# Patient Record
Sex: MALE | Race: WHITE
[De-identification: names, ages, dates, MRNs, and addresses within clinical notes are randomized per-mention and may not be internally consistent; named-entity substitution may affect disease eponyms.]

---

## 2017-06-26 ENCOUNTER — HOSPITAL ENCOUNTER (INPATIENT)
Dept: HOSPITAL 45 - C.EDB | Age: 82
LOS: 2 days | Discharge: HOME HEALTH | DRG: 440 | End: 2017-06-28
Attending: HOSPITALIST | Admitting: HOSPITALIST
Payer: COMMERCIAL

## 2017-06-26 VITALS
WEIGHT: 156.53 LBS | WEIGHT: 156.53 LBS | HEIGHT: 64 IN | BODY MASS INDEX: 26.72 KG/M2 | BODY MASS INDEX: 26.72 KG/M2 | HEIGHT: 64 IN

## 2017-06-26 DIAGNOSIS — I25.2: ICD-10-CM

## 2017-06-26 DIAGNOSIS — R94.31: ICD-10-CM

## 2017-06-26 DIAGNOSIS — Z79.82: ICD-10-CM

## 2017-06-26 DIAGNOSIS — I71.2: ICD-10-CM

## 2017-06-26 DIAGNOSIS — K59.09: ICD-10-CM

## 2017-06-26 DIAGNOSIS — K85.90: Primary | ICD-10-CM

## 2017-06-26 DIAGNOSIS — Z79.899: ICD-10-CM

## 2017-06-26 DIAGNOSIS — H40.9: ICD-10-CM

## 2017-06-26 DIAGNOSIS — I35.2: ICD-10-CM

## 2017-06-26 DIAGNOSIS — I10: ICD-10-CM

## 2017-06-26 DIAGNOSIS — R07.9: ICD-10-CM

## 2017-06-26 LAB
ALBUMIN/GLOB SERPL: 1.2 {RATIO} (ref 0.9–2)
ALP SERPL-CCNC: 62 U/L (ref 45–117)
ALT SERPL-CCNC: 21 U/L (ref 12–78)
ANION GAP SERPL CALC-SCNC: 7 MMOL/L (ref 3–11)
AST SERPL-CCNC: 20 U/L (ref 15–37)
BASOPHILS # BLD: 0.02 K/UL (ref 0–0.2)
BASOPHILS NFR BLD: 0.2 %
BUN SERPL-MCNC: 15 MG/DL (ref 7–18)
BUN/CREAT SERPL: 15.3 (ref 10–20)
CALCIUM SERPL-MCNC: 9 MG/DL (ref 8.5–10.1)
CHLORIDE SERPL-SCNC: 108 MMOL/L (ref 98–107)
CO2 SERPL-SCNC: 25 MMOL/L (ref 21–32)
COMPLETE: YES
CREAT CL PREDICTED SERPL C-G-VRATE: 48.9 ML/MIN
CREAT SERPL-MCNC: 0.96 MG/DL (ref 0.6–1.4)
EOSINOPHIL NFR BLD AUTO: 259 K/UL (ref 130–400)
GLOBULIN SER-MCNC: 3.1 GM/DL (ref 2.5–4)
GLUCOSE SERPL-MCNC: 98 MG/DL (ref 70–99)
HCT VFR BLD CALC: 39.3 % (ref 42–52)
IG%: 0.1 %
IMM GRANULOCYTES NFR BLD AUTO: 13.5 %
LYMPHOCYTES # BLD: 1.34 K/UL (ref 1.2–3.4)
MCH RBC QN AUTO: 31.3 PG (ref 25–34)
MCHC RBC AUTO-ENTMCNC: 33.1 G/DL (ref 32–36)
MCV RBC AUTO: 94.5 FL (ref 80–100)
MONOCYTES NFR BLD: 10.2 %
NEUTROPHILS # BLD AUTO: 1.9 %
NEUTROPHILS NFR BLD AUTO: 74.1 %
PMV BLD AUTO: 9.1 FL (ref 7.4–10.4)
POTASSIUM SERPL-SCNC: 3.7 MMOL/L (ref 3.5–5.1)
RBC # BLD AUTO: 4.16 M/UL (ref 4.7–6.1)
SODIUM SERPL-SCNC: 140 MMOL/L (ref 136–145)
WBC # BLD AUTO: 9.92 K/UL (ref 4.8–10.8)

## 2017-06-26 NOTE — EMERGENCY ROOM VISIT NOTE
History


Report prepared by Leena:  Nusrat Prakash


Under the Supervision of:  LOREN MonteO.


First contact with patient:  21:40


Chief Complaint:  ABDOMINAL PAIN


Stated Complaint:  HURT STOMACH,CONSTIPATION





History of Present Illness


The patient is an 85 year old male who presents to the Emergency Room with 

complaints of persistent abdominal pain that began 2 weeks ago.  He currently 

rates his discomfort as a 2/10 in severity.  The patient reports that two weeks 

ago his wife was taken to reside in a care home.  He states that he became 

nervous for her leaving, and he developed left chest pain, abdominal pain, and 

constipation.  The patient states that constipation is a chronic problem for him

, noting that he takes Miralax daily.  He states that he has not had a bowel 

movement in four days.  The patient states that his chest pain has been waxing 

and waning, noting that aspirin alleviated his symptoms, but nitroglycerin has 

not.  He states that the chest pain has been persistent for two days.  The 

patient reports radiating pain to his left shoulder and notes pain radiating 

into back.  He denies any pain radiating down his left arm.  The patient 

reports nausea today.  He reports a cardiac history.  The patient denies any 

fever, chills, shortness of breath or lower extremity swelling.  He reports 

difficulty urinating.





   Source of History:  patient


   Onset:  2 weeks ago


   Position:  abdomen


   Symptom Intensity:  2/10


   Timing:  other (persistent)


   Associated Symptoms:  + chest pain, + nausea, + back pain, + urinary symptoms

 (difficulty urinating), No fevers, No chills, No SOB


Note:


Associated Symptoms: left shoulder pain, constipation.





Review of Systems


See HPI for pertinent positives & negatives. A total of 10 systems reviewed and 

were otherwise negative.





Past Medical & Surgical


Medical Problems:


(1) Aortic Valve Disorder


(2) Diverticulitis


(3) Diverticulosis of sigmoid colon


(4) Elevated troponin I level


(5) Hyperlipidemia


(6) Hypertension Nos


(7) Hypothyroidism Nos


Surgical Problems:


(1) Cholecystectomy


(2) Hernia repair








Family History





Patient reports no known family medical history.





Social History


Smoking Status:  Never Smoker


Alcohol Use:  none


Drug Use:  none


Marital Status:  


Housing Status:  lives with significant other


Occupation Status:  retired





Current/Historical Medications


Scheduled


Aspirin (Aspirin Ec), 81 MG PO DAILY


Bimatoprost (Lumigan), 1 DROPS OPL HS


Brimonidine Tartrate-Timolol M (Combigan), 1 DROP OPB BID


Dorzolamide Hcl (Trusopt Oph), 1 DROPS OP BID


Fluocinonide (Fluocinonide), 1 APPLN TOP BID


Losartan Potassium (Cozaar), 1 TAB PO DAILY


Polyethylene Glycol 3350 (Miralax), 17 GM PO QPM


Polyethylene Glycol-Propylene (Systane), 1 DROPS OP DAILY


Prednisolone Acetate (Ophth) (Prednisolone Acetate), 1 DROP OPL QID


Prednisolone Acetate (Ophth) (Prednisolone Acetate), 2 DROP OPR DAILY





Scheduled PRN


Nitroglycerin (Nitrostat), 0.4 MG SL UD PRN for Chest Pain





Allergies


Coded Allergies:  


     Amlodipine (Verified  Allergy, Unknown, unknown, 12/22/16)


     Enalapril (Verified  Allergy, Unknown, unknown, 12/22/16)


     Sulfa Drugs (Verified  Adverse Reaction, Mild, JUST DID NOT FEEL LIKE 

HIMSELF, 12/22/16)





Physical Exam


Vital Signs











  Date Time  Temp Pulse Resp B/P (MAP) Pulse Ox O2 Delivery O2 Flow Rate FiO2


 


6/27/17 02:52  72 16 139/81 98 Room Air  


 


6/26/17 23:16  81 18 145/87 95 Room Air  


 


6/26/17 20:57 36.7 74 20 153/92 95 Room Air  











Physical Exam


GENERAL: alert, well appearing, well nourished, no distress, non-toxic 


EYE EXAM: normal conjunctiva, PERRL and EOM's grossly intact


OROPHARYNX: no exudate, no erythema, lips, buccal mucosa, and tongue normal and 

mucous membranes are moist


NECK: supple, no nuchal rigidity, no adenopathy, non-tender


LUNGS: Clear to auscultation. Normal chest wall mechanics


HEART: Systolic ejection murmur, S1 normal and S2 normal 


ABDOMEN: Mild generalized abdominal discomfort, dull to percussion, abdomen soft

, normo-active bowel sounds, no masses, no rebound or guarding. 


BACK: Back is symmetrical on inspection and there is no deformity, no midline 

tenderness, no CVA tenderness. 


SKIN: no rashes and no bruising 


UPPER EXTREMITIES: upper extremities are grossly normal. 


LOWER EXTREMITIES: Trace pitting edema.


NEURO EXAM: Normal sensorium, cranial nerves II-XII grossly intact, normal 

speech,  no gross weakness of arms, no gross weakness of legs.





Medical Decision & Procedures


ER Provider


Diagnostic Interpretation:


Chest x-ray interpreted by me, pending radiology review: cardiomegaly, 

prominent aortic silhouette, no effusion, increased interstitial markings 

bilaterally, no focal consolidation, similar compared to prior





CT abdomen and pelvis:


Mild peripancreatic haziness that may be from pancreatitis.  No pseudocyst.  

Cholecystectomy.  Aneurysmal ascending aorta measuring 5.5 cm.  Kelvin megaly.  

Small fat-containing ventral inguinal hernias.  Old granulomatous disease.  

Right renal cyst.  2 small to characterize low attenuation foci in the kidneys.

  Mildly enlarged prostate.  Colonic diverticula without diverticulitis.  

Unremarkable appendix.


radiologist: Manal Schoellerman, M.D.


study read at 00:47





Laboratory Results


6/26/17 23:05








Red Blood Count 4.16, Mean Corpuscular Volume 94.5, Mean Corpuscular Hemoglobin 

31.3, Mean Corpuscular Hemoglobin Concent 33.1, Mean Platelet Volume 9.1, 

Neutrophils (%) (Auto) 74.1, Lymphocytes (%) (Auto) 13.5, Monocytes (%) (Auto) 

10.2, Eosinophils (%) (Auto) 1.9, Basophils (%) (Auto) 0.2, Neutrophils # (Auto

) 7.35, Lymphocytes # (Auto) 1.34, Monocytes # (Auto) 1.01, Eosinophils # (Auto

) 0.19, Basophils # (Auto) 0.02





6/26/17 23:05

















Test


  6/26/17


23:05 6/26/17


23:50


 


White Blood Count


  9.92 K/uL


(4.8-10.8) 


 


 


Red Blood Count


  4.16 M/uL


(4.7-6.1) 


 


 


Hemoglobin


  13.0 g/dL


(14.0-18.0) 


 


 


Hematocrit 39.3 % (42-52)  


 


Mean Corpuscular Volume


  94.5 fL


() 


 


 


Mean Corpuscular Hemoglobin


  31.3 pg


(25-34) 


 


 


Mean Corpuscular Hemoglobin


Concent 33.1 g/dl


(32-36) 


 


 


Platelet Count


  259 K/uL


(130-400) 


 


 


Mean Platelet Volume


  9.1 fL


(7.4-10.4) 


 


 


Neutrophils (%) (Auto) 74.1 %  


 


Lymphocytes (%) (Auto) 13.5 %  


 


Monocytes (%) (Auto) 10.2 %  


 


Eosinophils (%) (Auto) 1.9 %  


 


Basophils (%) (Auto) 0.2 %  


 


Neutrophils # (Auto)


  7.35 K/uL


(1.4-6.5) 


 


 


Lymphocytes # (Auto)


  1.34 K/uL


(1.2-3.4) 


 


 


Monocytes # (Auto)


  1.01 K/uL


(0.11-0.59) 


 


 


Eosinophils # (Auto)


  0.19 K/uL


(0-0.5) 


 


 


Basophils # (Auto)


  0.02 K/uL


(0-0.2) 


 


 


RDW Standard Deviation


  41.5 fL


(36.4-46.3) 


 


 


RDW Coefficient of Variation


  12.1 %


(11.5-14.5) 


 


 


Immature Granulocyte % (Auto) 0.1 %  


 


Immature Granulocyte # (Auto)


  0.01 K/uL


(0.00-0.02) 


 


 


Anion Gap


  7.0 mmol/L


(3-11) 


 


 


Est Creatinine Clear Calc


Drug Dose 48.9 ml/min 


  


 


 


Estimated GFR (


American) 83.2 


  


 


 


Estimated GFR (Non-


American 71.8 


  


 


 


BUN/Creatinine Ratio 15.3 (10-20)  


 


Lactic Acid Level


  0.7 mmol/L


(0.4-2.0) 


 


 


Calcium Level


  9.0 mg/dl


(8.5-10.1) 


 


 


Total Bilirubin


  2.6 mg/dl


(0.2-1) 


 


 


Aspartate Amino Transf


(AST/SGOT) 20 U/L (15-37) 


  


 


 


Alanine Aminotransferase


(ALT/SGPT) 21 U/L (12-78) 


  


 


 


Alkaline Phosphatase


  62 U/L


() 


 


 


Total Protein


  6.9 gm/dl


(6.4-8.2) 


 


 


Albumin


  3.8 gm/dl


(3.4-5.0) 


 


 


Globulin


  3.1 gm/dl


(2.5-4.0) 


 


 


Albumin/Globulin Ratio 1.2 (0.9-2)  


 


Lipase


  2575 U/L


() 


 


 


Urine Color  YELLOW 


 


Urine Appearance  CLEAR (CLEAR) 


 


Urine pH  6.0 (4.5-7.5) 


 


Urine Specific Gravity


  


  1.009


(1.000-1.030)


 


Urine Protein  NEG (NEG) 


 


Urine Glucose (UA)  NEG (NEG) 


 


Urine Ketones  NEG (NEG) 


 


Urine Occult Blood  NEG (NEG) 


 


Urine Nitrite  NEG (NEG) 


 


Urine Bilirubin  NEG (NEG) 


 


Urine Urobilinogen  NEG (NEG) 


 


Urine Leukocyte Esterase  NEG (NEG) 








Laboratory results per my review.





Medications Administered











 Medications


  (Trade)  Dose


 Ordered  Sig/González


 Route  Start Time


 Stop Time Status Last Admin


Dose Admin


 


 Sodium Chloride  1,000 ml @ 


 200 mls/hr  Q5H STAT


 IV  6/26/17 23:44


 6/27/17 04:24 DC 6/26/17 23:44


200 MLS/HR











ECG


Indication:  chest pain


Rate (beats per minute):  57


Rhythm:  sinus bradycardia


Findings:  1st degree AV block, LBBB, no acute ischemic change, left axis 

deviation


Comparison ECG Date:  12/23/16


Change:


When compared to EKG done on 12/23/16, QRS has widened, but no change in 

morphology.





ED Course


2145: The patient was evaluated in room C11B. A complete history and physical 

exam was performed. 





0150: Patient resting comfortably, made aware of all results.  Patient denies 

any recent alcohol use, states his gallbladder was previously removed.  Patient 

agreeable with plan for admission.





Medical Decision


Differential diagnoses includes but is not limited to acute coronary syndrome, 

myocardial infarction, pericarditis, pulmonary embolus, aortic dissection, 

pneumonia, pneumothorax, musculoskeletal, shingles, esophageal, gastritis, 

peptic ulcer disease, GERD, gallbladder disease, pancreatitis, small bowel 

obstruction, ischemic bowel, irritable bowel disease, irritable bowel syndrome, 

appendicitis, diverticulitis, malignancy, hernia, urinary tract infection, 

torsion, perforation, trauma, infectious. 





Medication Reconciliation: I attest that I have personally reviewed the patient'

s current medication list.





Blood pressure screening: Patient was found to have an elevated blood pressure 

and was referred to their primary doctor for recheck and further treatment. 





Patient with atypical presentation for both abdominal discomfort as well as 

chest pain.  Patient with significant prior cardiac history and previously left 

AGAINST MEDICAL ADVICE while having active ACS.  Patient resting here, stable 

vital signs, and only mild discomfort noted during exam.  Patient found to have 

significant elevated lipase and evidence of pancreatitis by CT.  Patient has 

had prior cholecystectomy and denies any recent alcohol use.  Patient was 

agreeable with admission for continued monitoring and treatment.  No evidence 

of ACS at this time.  Likely slight EKG abnormalities related to prior ACS that 

was not treated.  Doubt additional vascular/aortic pathology.  Doubt PE or 

pulmonary pathology.  No evidence of perforation or GI bleed.  Patient felt 

pain controlled here and didn't denied any additional medications.  Patient 

aware of all results.





Impression





 Primary Impression:  


 GENERALIZED ABDOMINAL PAIN


 Additional Impressions:  


 Pancreatitis


 Chest pain





Scribe Attestation


The scribe's documentation has been prepared under my direction and personally 

reviewed by me in its entirety. I confirm that the note above accurately 

reflects all work, treatment, procedures, and medical decision making performed 

by me.





Departure Information


Referrals


SILVIA David MD (PCP)





Patient Instructions


My [facility]





Problem Qualifiers








 Additional Impressions:  


 Pancreatitis


 Chronicity:  acute  Pancreatitis type:  unspecified pancreatitis type  Acute 

pancreatitis complication:  no infection or necrosis  Qualified Codes:  K85.90 

- Acute pancreatitis without necrosis or infection, unspecified


 Chest pain


 Chest pain type:  unspecified  Qualified Codes:  R07.9 - Chest pain, 

unspecified

## 2017-06-27 VITALS
TEMPERATURE: 97.7 F | SYSTOLIC BLOOD PRESSURE: 118 MMHG | OXYGEN SATURATION: 97 % | DIASTOLIC BLOOD PRESSURE: 70 MMHG | HEART RATE: 45 BPM

## 2017-06-27 VITALS
DIASTOLIC BLOOD PRESSURE: 75 MMHG | TEMPERATURE: 99.14 F | SYSTOLIC BLOOD PRESSURE: 128 MMHG | OXYGEN SATURATION: 98 % | HEART RATE: 63 BPM

## 2017-06-27 VITALS
HEART RATE: 54 BPM | TEMPERATURE: 98.06 F | SYSTOLIC BLOOD PRESSURE: 167 MMHG | DIASTOLIC BLOOD PRESSURE: 79 MMHG | OXYGEN SATURATION: 99 %

## 2017-06-27 VITALS
TEMPERATURE: 98.24 F | HEART RATE: 61 BPM | DIASTOLIC BLOOD PRESSURE: 77 MMHG | OXYGEN SATURATION: 98 % | SYSTOLIC BLOOD PRESSURE: 132 MMHG

## 2017-06-27 VITALS
TEMPERATURE: 99.32 F | HEART RATE: 67 BPM | OXYGEN SATURATION: 95 % | DIASTOLIC BLOOD PRESSURE: 78 MMHG | SYSTOLIC BLOOD PRESSURE: 137 MMHG

## 2017-06-27 VITALS
OXYGEN SATURATION: 94 % | TEMPERATURE: 98.06 F | DIASTOLIC BLOOD PRESSURE: 87 MMHG | HEART RATE: 62 BPM | SYSTOLIC BLOOD PRESSURE: 149 MMHG

## 2017-06-27 LAB
APPEARANCE UR: CLEAR
BILIRUB UR-MCNC: (no result) MG/DL
CHOLEST/HDLC SERPL: 4.5 {RATIO}
CKMB/CK RATIO: 3.3 (ref 0–3)
CKMB/CK RATIO: 3.8 (ref 0–3)
COLOR UR: YELLOW
GLUCOSE UR QL: 39 MG/DL
INR PPP: 1 (ref 0.9–1.1)
KETONES UR QL STRIP: 119 MG/DL
MANUAL MICROSCOPIC REQUIRED?: NO
NITRITE UR QL STRIP: (no result)
NITRITE UR QL STRIP: 79 MG/DL (ref 0–150)
PARTIAL THROMBOPLASTIN RATIO: 1
PH UR STRIP: 6 [PH] (ref 4.5–7.5)
PH UR: 174 MG/DL (ref 0–200)
PROTHROMBIN TIME: 11 SECONDS (ref 9–12)
REVIEW REQ?: NO
SP GR UR STRIP: 1.01 (ref 1–1.03)
URINE BILL WITH OR WITHOUT MIC: (no result)
UROBILINOGEN UR-MCNC: (no result) MG/DL
VERY LOW DENSITY LIPOPROT CALC: 16 MG/DL
ZZUR CULT IF INDIC CLEAN CATCH: NO

## 2017-06-27 RX ADMIN — DORZOLAMIDE HYDROCHLORIDE SCH DROPS: 20 SOLUTION/ DROPS OPHTHALMIC at 09:13

## 2017-06-27 RX ADMIN — POLYVINYL ALCOHOL SCH DROPS: 14 SOLUTION/ DROPS OPHTHALMIC at 07:46

## 2017-06-27 RX ADMIN — Medication SCH MG: at 07:46

## 2017-06-27 RX ADMIN — SODIUM CHLORIDE AND POTASSIUM CHLORIDE SCH MLS/HR: 9; 1.49 INJECTION, SOLUTION INTRAVENOUS at 16:33

## 2017-06-27 RX ADMIN — ALUMINUM ZIRCONIUM TRICHLOROHYDREX GLY SCH EA: 0.2 STICK TOPICAL at 16:00

## 2017-06-27 RX ADMIN — LOSARTAN POTASSIUM SCH MG: 25 TABLET ORAL at 07:47

## 2017-06-27 RX ADMIN — POLYVINYL ALCOHOL SCH DROPS: 14 SOLUTION/ DROPS OPHTHALMIC at 04:58

## 2017-06-27 RX ADMIN — Medication SCH GM: at 07:47

## 2017-06-27 RX ADMIN — DORZOLAMIDE HYDROCHLORIDE SCH DROPS: 20 SOLUTION/ DROPS OPHTHALMIC at 07:46

## 2017-06-27 RX ADMIN — POLYVINYL ALCOHOL SCH DROPS: 14 SOLUTION/ DROPS OPHTHALMIC at 09:12

## 2017-06-27 RX ADMIN — SODIUM CHLORIDE AND POTASSIUM CHLORIDE SCH MLS/HR: 9; 1.49 INJECTION, SOLUTION INTRAVENOUS at 04:58

## 2017-06-27 NOTE — HISTORY AND PHYSICAL
History & Physical


Date & Time of Service:


Jun 27, 2017 at 03:22


Chief Complaint:


Hurt Stomach,Constipation


Primary Care Physician:


SILVIA David MD


History of Present Illness


Source:  patient, hospital records


The patient is a 85-year-old male who presents to emergency department with 

symptoms of abdominal pain that began about 2 weeks ago, and more recently over 

the past 2 days has developed chest pain radiating to his left shoulder and 

toward his back.  He does have a known history of heart disease.  He does 

report that aspirin has helped to relieve his chest discomfort, but 

nitroglycerin sublinguals have not.  He presently is under stress due to the 

illness of his wife who was moved to a care home 2 weeks ago.  He does have 

chronic constipation for which MiraLAX helps, but he has not had a bowel 

movement in 4 days.  He has chronic difficulty urinating.





Past Medical/Surgical History


Medical Problems:


(1) Aortic Valve Disorder


Status: Chronic  





(2) Diverticulitis


Status: Chronic  





(3) Diverticulosis of sigmoid colon


Status: Resolved  





(4) Hyperlipidemia


Status: Chronic  





(5) Hypertension Nos


Status: Chronic  





(6) Hypothyroidism Nos


Status: Chronic  





Surgical Problems:


(1) Cholecystectomy


Status: Resolved  





(2) Hernia repair


Status: Resolved  











Family History





Patient reports no known family medical history.





Social History


Smoking Status:  Never Smoker


Smokeless Tobacco Use:  No


Alcohol Use:  none


Drug Use:  none


Marital Status:  


Housing status:  lives with family


Occupational Status:  retired





Immunizations


History of Influenza Vaccine:  No


History of Tetanus Vaccine?:  No


History of Pneumococcal:  No


History of Hepatitis B Vaccine:  No





Multi-Drug Resistant Organisms


History of MDRO:  No





Allergies


Coded Allergies:  


     Amlodipine (Verified  Allergy, Unknown, unknown, 12/22/16)


     Enalapril (Verified  Allergy, Unknown, unknown, 12/22/16)


     Sulfa Drugs (Verified  Adverse Reaction, Mild, JUST DID NOT FEEL LIKE 

HIMSELF, 12/22/16)





Home Medications


Scheduled


Aspirin (Aspirin Ec), 81 MG PO DAILY


Bimatoprost (Lumigan), 1 DROPS OPL HS


Brimonidine Tartrate-Timolol M (Combigan), 1 DROP OPB BID


Dorzolamide Hcl (Trusopt Oph), 1 DROPS OP BID


Fluocinonide (Fluocinonide), 1 APPLN TOP BID


Losartan Potassium (Cozaar), 1 TAB PO DAILY


Polyethylene Glycol 3350 (Miralax), 17 GM PO QPM


Polyethylene Glycol-Propylene (Systane), 1 DROPS OP DAILY


Prednisolone Acetate (Ophth) (Prednisolone Acetate), 1 DROP OPL QID


Prednisolone Acetate (Ophth) (Prednisolone Acetate), 2 DROP OPR DAILY





Scheduled PRN


Nitroglycerin (Nitrostat), 0.4 MG SL UD PRN for Chest Pain





Review of Systems


The patient denies cough, lower extremity swelling,  sore throat, fevers, chills

, sweats, weight change, vomiting, pelvic pain, blood in urine or stool, 

lightheadedness, dizziness, headache, memory loss, rash, abnormal bruising or 

bleeding, imbalance, focal or generalized weakness, numbness or tingling in 

arms or legs, night sweats.


The review of systems is otherwise negative other than for that already noted 

above, and at least 10 systems have been reviewed.





Physical Exam


Vital Signs











  Date Time  Temp Pulse Resp B/P (MAP) Pulse Ox O2 Delivery O2 Flow Rate FiO2


 


6/27/17 02:52  72 16 139/81 98 Room Air  


 


6/26/17 23:16  81 18 145/87 95 Room Air  


 


6/26/17 20:57 36.7 74 20 153/92 95 Room Air  








The patient is awake, well-developed and adequately nourished, alert and 

oriented 3, normocephalic and atraumatic, lying in bed and in no acute 

distress.


HEENT--PERRL, EOMI, mucous membranes  and oropharynx dry.


Neck--supple, no JVD or bruits, thyroid normal, trachea midline, no adenopathy.


Heart--normal S1 and S2, no extra beats, no murmurs, rubs or gallops.


Lungs--clear bilaterally with good air movement, no respiratory distress, no 

accessory muscle use.


Abdomen--normal bowel sounds and soft, nontender and nondistended, no hernias 

or masses,  no organomegaly.


Extremities--no cyanosis, clubbing or edema. There are good distal pulses b/l.


Dermatologic--normal skin turgor, normal color, warm and dry, no abnormal lymph 

nodes, no rash.


Neurologic--cranial nerves II through XII grossly intact, motor and sensory 

examination normal.


Rheumatologic--normal range of motion, nontender, muscles and joints.


Psychiatric--normal affect.





Diagnostics


Laboratory Results





Results Past 24 Hours








Test


  6/26/17


23:05 6/26/17


23:50 Range/Units


 


 


White Blood Count 9.92  4.8-10.8  K/uL


 


Red Blood Count 4.16  4.7-6.1  M/uL


 


Hemoglobin 13.0  14.0-18.0  g/dL


 


Hematocrit 39.3  42-52  %


 


Mean Corpuscular Volume 94.5    fL


 


Mean Corpuscular Hemoglobin 31.3  25-34  pg


 


Mean Corpuscular Hemoglobin


Concent 33.1


  


  32-36  g/dl


 


 


Platelet Count 259  130-400  K/uL


 


Mean Platelet Volume 9.1  7.4-10.4  fL


 


Neutrophils (%) (Auto) 74.1   %


 


Lymphocytes (%) (Auto) 13.5   %


 


Monocytes (%) (Auto) 10.2   %


 


Eosinophils (%) (Auto) 1.9   %


 


Basophils (%) (Auto) 0.2   %


 


Neutrophils # (Auto) 7.35  1.4-6.5  K/uL


 


Lymphocytes # (Auto) 1.34  1.2-3.4  K/uL


 


Monocytes # (Auto) 1.01  0.11-0.59  K/uL


 


Eosinophils # (Auto) 0.19  0-0.5  K/uL


 


Basophils # (Auto) 0.02  0-0.2  K/uL


 


RDW Standard Deviation 41.5  36.4-46.3  fL


 


RDW Coefficient of Variation 12.1  11.5-14.5  %


 


Immature Granulocyte % (Auto) 0.1   %


 


Immature Granulocyte # (Auto) 0.01  0.00-0.02  K/uL


 


Sodium Level 140  136-145  mmol/L


 


Potassium Level 3.7  3.5-5.1  mmol/L


 


Chloride Level 108    mmol/L


 


Carbon Dioxide Level 25  21-32  mmol/L


 


Anion Gap 7.0  3-11  mmol/L


 


Blood Urea Nitrogen 15  7-18  mg/dl


 


Creatinine


  0.96


  


  0.60-1.40


mg/dl


 


Est Creatinine Clear Calc


Drug Dose 48.9


  


   ml/min


 


 


Estimated GFR (


American) 83.2


  


   


 


 


Estimated GFR (Non-


American 71.8


  


   


 


 


BUN/Creatinine Ratio 15.3  10-20  


 


Random Glucose 98  70-99  mg/dl


 


Lactic Acid Level 0.7  0.4-2.0  mmol/L


 


Calcium Level 9.0  8.5-10.1  mg/dl


 


Total Bilirubin 2.6  0.2-1  mg/dl


 


Aspartate Amino Transf


(AST/SGOT) 20


  


  15-37  U/L


 


 


Alanine Aminotransferase


(ALT/SGPT) 21


  


  12-78  U/L


 


 


Alkaline Phosphatase 62    U/L


 


Troponin I 0.018  0-0.045  ng/ml


 


Total Protein 6.9  6.4-8.2  gm/dl


 


Albumin 3.8  3.4-5.0  gm/dl


 


Globulin 3.1  2.5-4.0  gm/dl


 


Albumin/Globulin Ratio 1.2  0.9-2  


 


Lipase 2575    U/L


 


Urine Color  YELLOW  


 


Urine Appearance  CLEAR CLEAR  


 


Urine pH  6.0 4.5-7.5  


 


Urine Specific Gravity  1.009 1.000-1.030  


 


Urine Protein  NEG NEG  


 


Urine Glucose (UA)  NEG NEG  


 


Urine Ketones  NEG NEG  


 


Urine Occult Blood  NEG NEG  


 


Urine Nitrite  NEG NEG  


 


Urine Bilirubin  NEG NEG  


 


Urine Urobilinogen  NEG NEG  


 


Urine Leukocyte Esterase  NEG NEG  











EKG


EKG shows sinus bradycardia at 57 beats minute, with first-degree heart block, 

left axis deviation, LVH, question new LAFB with QRS widening, which is new 

compared to December 2016.





Impression


Assessment and Plan


Chest pain/new EKG changes--the patient be admitted to the telemetry unit for 

serial cardiac enzymes, cardiac rhythm monitoring and a 2-D echocardiogram with 

Dopplers.  We'll continue aspirin 81 mg by mouth daily and losartan.  We'll 

consult cardiology.





Pancreatitis--lipase is elevated at 2575.  We'll follow serial laboratories, 

keep on a full liquid diet.





Glaucoma--continue usual medications of Lumigan, Combigan, Trusopt, 

prednisolone acetate and Systane.





Constipation--continue MiraLAX daily.





Level of Care


Telemetry





Advanced Directives


Existing Advance Directive:  No


Existing Living Will:  No


Existing Power of :  No





Resuscitation Status


FULL RESUSCITATION





VTE Prophylaxis


VTE Risk Assessment Done? Y/N:  Yes


Risk Level:  Low


Given or contraindicated:  SCD's

## 2017-06-27 NOTE — DIAGNOSTIC IMAGING REPORT
CT ANGIOGRAPHY OF THE CHEST WITH AND WITHOUT CONTRAST



CLINICAL HISTORY: Thoracic aortic aneurysm. Chest pain.    



TECHNIQUE: Unenhanced and arterial phase imaging of the chest was performed.

Injection of 94 cc of Optiray 320 IV was uneventful. Sagittal and coronal

reconstructed reviewed as well as maximal intensity projections on an

independent 3-D workstation.



COMPARISON STUDY:  Chest CT November 2, 2007.



FINDINGS: There is no intramural hematoma within the thoracic aorta. No

dissection of the thoracic aorta is noted. The ascending aorta measures 5.3 cm

at the level of the main pulmonary artery. This is minimally increased since CT

of November 2, 2007 when it measured 5.2 cm. There is moderate plaque. There is

extensive aortic valvular calcification. Aorta measures 4.2 cm at the level of

the sinuses of Valsalva. The heart is moderately enlarged. There is no

pericardial effusion. There are several calcified subcarinal lymph nodes.

Central airways are patent. There is no consolidation to suggest pneumonia. No

pneumothorax or pleural effusion is present. Bony thorax is unremarkable.

Visualized portions the upper abdomen demonstrate a few right renal cysts and

peripancreatic infiltration suggestive of acute pancreatitis which was shown on

recent abdominal CT.



IMPRESSION:  



1. Ascending aortic aneurysm, measuring 5.3 cm. Minimal increase in caliber

since exam of November 2, 2007. No dissection.



2. Moderate cardiomegaly.



3. No acute intrathoracic findings.



4. Peripancreatic infiltration indicative of acute pancreatitis, as shown on

recent abdominal CT.







Electronically signed by:  Gabino Brown M.D.

6/27/2017 1:53 PM



Dictated Date/Time:  6/27/2017 1:40 PM

## 2017-06-27 NOTE — DIAGNOSTIC IMAGING REPORT
CT OF THE ABDOMEN AND PELVIS WITH CONTRAST



CLINICAL HISTORY: Pancreatitis.    



COMPARISON STUDY:  CT of the abdomen and pelvis September 1, 2013 and MRCP

September 3, 2013



TECHNIQUE: Following IV administration of 117 mL of Optiray-320, axial images of

the abdomen and pelvis were obtained from the lung bases to the proximal femurs.

Images were reviewed in the axial, sagittal, and coronal planes. IV contrast was

administered without complication.



CT DOSE: 350.96 mGy.cm



FINDINGS: Visual portions of the lower chest demonstrate aneurysmal dilatation

of visualized portions of the ascending aorta which measures approximately 5.5

cm at the level of the main pulmonary artery. This is partially imaged on this

exam but likely mildly increased since exam of November 2, 2007 when it measured

5.2 cm. No dissection is shown within visualized portions of the aorta. The

abdominal aorta is ectatic with moderate plaque. The liver, spleen and adrenal

glands are unremarkable. There is no biliary ductal dilatation status post

cholecystectomy. There is mild peripancreatic infiltration and fluid. There is

no peripancreatic fluid collection. There is no pancreatic ductal dilatation.

Several water attenuation bilateral renal lesions reflect cysts. A few

subcentimeter renal lesions are too small to characterize. There is no evidence

for a bowel obstruction. The appendix is normal. This extensive colonic

diverticulosis without evidence for acute diverticulitis. There are

fat-containing bilateral inguinal hernias and fat-containing ventral hernia. The

prostate is moderately enlarged.







IMPRESSION:  



1. Mild peripancreatic infiltration and fluid suggestive of acute pancreatitis.

No biliary ductal dilatation status post cholecystectomy.



2. Aneurysmal dilatation of the ascending aorta, partially imaged on this exam.

Aorta measures approximately 5.5 cm at the level of the main pulmonary artery

which is mildly increased since CT of November 2, 2007.



3. Moderate cardiomegaly.



4. Left colon diverticulosis without evidence for acute diverticulitis.







Electronically signed by:  Gabino Brown M.D.

6/27/2017 7:31 AM



Dictated Date/Time:  6/27/2017 7:20 AM

## 2017-06-27 NOTE — HOSPITALIST PROGRESS NOTE
Hospitalist Progress Note


Date of Service


Jun 27, 2017.


 (Rosy Dennis ., PA-C)





Subjective


Pt evaluation today including:  conversation w/ patient, conversation w/ family 

(daughter- at bedside ), physical exam, chart review, lab review, review of 

studies, review of inpatient medication list


Voiding:  no voiding problems, no incontinence


Patient states he is currently feeling comfortable.


Admits to epigastric region pain- IV Morphine w/ good pain control. 


Decreased appetite- currently on full liquids.


+Constipation 


+Intermittent left sided CP radiating to left shoulder region. Denies any 

alleviating/exacerbation factors.


Patient denies any fever, chills, sweats, lightheadedness, dizziness, vision 

changes, palpitations, edema, SOB, wheezing, cough, nausea, vomiting, diarrhea, 

urinary symptoms, melena, numbness/tingling, weakness, muscle/joint pain, 

anxiety/depression, active bleeding, or new skin discoloration/changes. 





Daughter at bedside- she is concerned about patient returning home as he lives 

alone. His wife is currently on hospice and staying with the daughter so most 

of her time is occupied. All other questions/concerns answered.  


 (Rosy Dennis ., PA-C)





Medications





Current Inpatient Medications








 Medications


  (Trade)  Dose


 Ordered  Sig/González


 Route  Start Time


 Stop Time Status Last Admin


Dose Admin


 


 Ioversol


  (Optiray 320)  100 ml  UD  PRN


 IV  6/27/17 00:00


 7/1/17 00:00   


 


 


 Aspirin


  (Ecotrin Tab)  81 mg  DAILY


 PO  6/27/17 09:00


 7/27/17 08:59  6/27/17 07:46


81 MG


 


 Dorzolamide HCl


  (Trusopt 2% Oph


 Soln)  1 drops  BID


 OP  6/27/17 09:00


 7/27/17 08:59   


 


 


 Losartan Potassium


  (coZAAR TAB)  25 mg  DAILY


 PO  6/27/17 09:00


 7/27/17 08:59  6/27/17 07:47


25 MG


 


 Nitroglycerin


  (Nitrostat Tab)  0.4 mg  UD  PRN


 SL  6/27/17 03:00


 7/27/17 02:59   


 


 


 Miscellaneous


 Information


  (Order Awaiting


 Action)  1 ea  QS


 N/A  6/27/17 16:00


 7/27/17 15:59   


 


 


 Bimatoprost


  (Lumigan 0.01%)  1 drops  HS


 OPL  6/27/17 21:00


 7/27/17 20:59  6/27/17 07:46


1 DROPS


 


 Artificial Tears


  (Artificial


 Tears)  1 drops  DAILY


 OP  6/27/17 04:30


 7/27/17 04:29  6/27/17 04:58


1 DROPS


 


 Polyethylene


  (Miralax Powder


 Packet)  17 gm  DAILY


 PO  6/27/17 09:00


 7/27/17 08:59  6/27/17 07:47


17 GM


 


 Miscellaneous


  (Iv Fluids


 Completed)  1 ea  PRN  PRN


 N/A  6/27/17 03:15


 6/27/18 03:14   


 


 


 Potassium


 Chloride/Sodium


 Chloride  1,000 ml @ 


 100 mls/hr  Q10H


 IV  6/27/17 04:30


 7/27/17 04:29  6/27/17 04:58


100 MLS/HR


 


 Acetaminophen


  (Tylenol Tab)  650 mg  Q4H  PRN


 PO  6/27/17 03:00


 7/27/17 02:59  6/27/17 04:10


650 MG


 


 Zolpidem Tartrate


  (Ambien Tab)  5 mg  HSZ  PRN


 PO  6/27/17 03:00


 7/27/17 02:59   


 


 


 Ondansetron HCl


  (Zofran Inj)  4 mg  Q6H  PRN


 IV  6/27/17 03:00


 7/27/17 02:59   


 


 


 Miscellaneous


 Information


  (Order Awaiting


 Action)  1 ea  QS


 N/A  6/27/17 08:00


 7/27/17 07:59   


 


 


 Miscellaneous


 Information


  (Order Awaiting


 Action)  1 ea  QS


 N/A  6/27/17 08:00


 7/27/17 07:59   


 


 


 Morphine Sulfate


  (MoRPHine


 SULFATE INJ)  1 mg  Q3H  PRN


 IV  6/27/17 09:00


 7/11/17 08:59  6/27/17 10:17


1 MG


 


 Lorazepam


  (Ativan Inj)  1 mg  Q4H  PRN


 IV  6/27/17 09:00


 7/27/17 08:59   


 








 (Rosy Dennis PA-C)





Objective


Vital Signs











  Date Time  Temp Pulse Resp B/P (MAP) Pulse Ox O2 Delivery O2 Flow Rate FiO2


 


6/27/17 11:33 36.5 45 16 118/70 (86) 97 Room Air  


 


6/27/17 07:20 36.7 62 20 149/87 (107) 94 Room Air  


 


6/27/17 04:00 36.7 54 20 167/79 99 Room Air  


 


6/27/17 03:31  75 18 137/86 99   


 


6/27/17 02:52  72 16 139/81 98 Room Air  


 


6/26/17 23:16  81 18 145/87 95 Room Air  


 


6/26/17 20:57 36.7 74 20 153/92 95 Room Air  








 (Rosy Dennis, PA-C)





Physical Exam


General Appearance:  no apparent distress


Eyes:  normal inspection, PERRL


ENT:  hearing grossly normal


Neck:  supple


Respiratory/Chest:  lungs clear, no respiratory distress, no accessory muscle 

use


Cardiovascular:  regular rate, rhythm, + systolic murmur


Abdomen:  normal bowel sounds, soft, + tenderness (epigastric region )


Extremities:  no pedal edema, no calf tenderness


Neurologic/Psychiatric:  alert, normal mood/affect, oriented x 3


Skin:  normal color, warm/dry, no rash


 (Rosy Dennis ., PA-C)





Laboratory Results





Last 24 Hours








Test


  6/26/17


23:05 6/26/17


23:50 6/27/17


11:07


 


White Blood Count 9.92 K/uL   


 


Red Blood Count 4.16 M/uL   


 


Hemoglobin 13.0 g/dL   


 


Hematocrit 39.3 %   


 


Mean Corpuscular Volume 94.5 fL   


 


Mean Corpuscular Hemoglobin 31.3 pg   


 


Mean Corpuscular Hemoglobin


Concent 33.1 g/dl 


  


  


 


 


Platelet Count 259 K/uL   


 


Mean Platelet Volume 9.1 fL   


 


Neutrophils (%) (Auto) 74.1 %   


 


Lymphocytes (%) (Auto) 13.5 %   


 


Monocytes (%) (Auto) 10.2 %   


 


Eosinophils (%) (Auto) 1.9 %   


 


Basophils (%) (Auto) 0.2 %   


 


Neutrophils # (Auto) 7.35 K/uL   


 


Lymphocytes # (Auto) 1.34 K/uL   


 


Monocytes # (Auto) 1.01 K/uL   


 


Eosinophils # (Auto) 0.19 K/uL   


 


Basophils # (Auto) 0.02 K/uL   


 


RDW Standard Deviation 41.5 fL   


 


RDW Coefficient of Variation 12.1 %   


 


Immature Granulocyte % (Auto) 0.1 %   


 


Immature Granulocyte # (Auto) 0.01 K/uL   


 


Sodium Level 140 mmol/L   


 


Potassium Level 3.7 mmol/L   


 


Chloride Level 108 mmol/L   


 


Carbon Dioxide Level 25 mmol/L   


 


Anion Gap 7.0 mmol/L   


 


Blood Urea Nitrogen 15 mg/dl   


 


Creatinine 0.96 mg/dl   


 


Est Creatinine Clear Calc


Drug Dose 48.9 ml/min 


  


  


 


 


Estimated GFR (


American) 83.2 


  


  


 


 


Estimated GFR (Non-


American 71.8 


  


  


 


 


BUN/Creatinine Ratio 15.3   


 


Random Glucose 98 mg/dl   


 


Lactic Acid Level 0.7 mmol/L   


 


Calcium Level 9.0 mg/dl   


 


Total Bilirubin 2.6 mg/dl   


 


Aspartate Amino Transf


(AST/SGOT) 20 U/L 


  


  


 


 


Alanine Aminotransferase


(ALT/SGPT) 21 U/L 


  


  


 


 


Alkaline Phosphatase 62 U/L   


 


Troponin I 0.018 ng/ml   0.027 ng/ml 


 


Total Protein 6.9 gm/dl   


 


Albumin 3.8 gm/dl   


 


Globulin 3.1 gm/dl   


 


Albumin/Globulin Ratio 1.2   


 


Lipase 2575 U/L   


 


Urine Color  YELLOW  


 


Urine Appearance  CLEAR  


 


Urine pH  6.0  


 


Urine Specific Gravity  1.009  


 


Urine Protein  NEG  


 


Urine Glucose (UA)  NEG  


 


Urine Ketones  NEG  


 


Urine Occult Blood  NEG  


 


Urine Nitrite  NEG  


 


Urine Bilirubin  NEG  


 


Urine Urobilinogen  NEG  


 


Urine Leukocyte Esterase  NEG  


 


Total Creatine Kinase   52 U/L 


 


Creatine Kinase MB   2.0 ng/ml 


 


Creatine Kinase MB Ratio   3.8 








 (Rosy Dennis, EDILIA)





Assessment and Plan


The patient is a 85-year-old male who presents to emergency department with 

symptoms of abdominal pain that began about 2 weeks ago, and more recently over 

the past 2 days has developed chest pain radiating to his left shoulder and 

toward his back.  He does have a known history of heart disease.  He does 

report that aspirin has helped to relieve his chest discomfort, but 

nitroglycerin sublinguals have not.  He presently is under stress due to the 

illness of his wife who was moved to a care home 2 weeks ago.  He does have 

chronic constipation for which MiraLAX helps, but he has not had a bowel 

movement in 4 days.  He has chronic difficulty urinating.





Chest pain and new EKG changes/CAD:


- Admit to tele for cardiac monitoring


- Trend cardiac enzymes


- EKG QAM and PRN CP 


- ECHO pending 


- Continue ASA 81 mg daily 


- Consulted cardiology, appreciate recommendations- follows w/ Dr. Aguilar 





Severe aortic stenosis- noted: Follows w/ Dr. Aguilar





5.5 cm aortic aneurysm- mildly increased from study in 2007: 


- Known diagnosis to patient


- CTA of chest pending 


- f/u outpt w/ vascular surgery  





HTN- CONTROLLED: Continue Losartan 25 mg daily  





Acute pancreatitis, lipase is elevated at 2575- etiology uncertain (does have a 

h/o acute pancreatitis in 2013 ?passed CBD stone vs ETOH):


- Follow lipase/amylase


- IV NS + 20 mEq KCL @ 100 ml/hr 


- IV Morphine 1 mg q3 hrs PRN and Tylenol 650 mg q4 hrs PRN for pain control 


- Full liquid diet 


- Denies recent ETOH use, calcium WNL, no trauma, low probability of infectious 

cause  


- Check triglycerides    


- Elevated total bilirubin, ?CBD stone- follow 


- If clinically improves w/ supportive care as above, no need for inpatient GI 

consult- can f/u/ outpt 





h/o hypercholesterolemia: no medications noted 





Glaucoma: Continue usual medications of Lumigan, Combigan, Trusopt, 

Prednisolone acetate, and Systane





Constipation: 


- CT of abdomen- no obstruction 


- Continue MiraLAX daily


   -- Per patient, has not taking over the last couple of days, which usually 

causes him to become constipated- will continue MiraLAX and add additional 

medications PRN 





DVT Prophylaxis: TEDs/SCDs 





Code Status: LEVEL I, FULL





Dispo: From home, lives alone-  consulted 


- PT/OT evaluations pending 


- Daughter, Jennifer, would like to be updated daily, # [phone]


 (Rosy Dennis, EDILIA)


I personally examined pt and verified all riggins points w JUAN Dennis PA-C


feeling better ate tomato soup no problems no significant abdominal pain or 

nausea now.  notes that he really wants to get home to wife - she is sick and 

dying "and could pass any minute" but also wants to make sure he's doing well 

enough to be able to be home.  


ROS otherwise negative except for as above


vitals noted nad breathing unlabored no pallor or icterus, abd soft maybe 

mildly tender epigastrum but no guarding/rebound/rigidity





acute (recurrent) pancreatitis - no evidence of stones, current TG level 

pending but prior have not been elevated enough to cause pancreatitis, no 

obvious/clear/admitted EtOH hx.  given situation with wife and w/u for 

recurrent pancreatitis being non-urgent - will manage acute episode and then 

have outpt w/u for recurrent.  improving.  continue IVF (more gently than 

nromal for pancreatitis given cardiomyopathy), advance diet, hopefully home in 

AM





cardiomyopathy/EKG changes - suspect baseline, echo noted, nothing appearing 

acute, await cardiology input as consulted by admitting physician





TAA - much like w/u for recurrent pancreatitis, no sx, not appearing dissecting 

- will ask for outpt vascular eval; for now risk factor modification (will 

check full lipid panel - from 2015 appears would benefit from statin, but with 

age will wnat to make sure not suppressing lipids overly); continue to follow 

BP - low threshold to start beta blocker but with age and some BP being low-

normal, risk of iatrogenesis is high -- may need ongoing outpt f/u for BP as 

well to better longitudinally assess risk/benefit of treatment





DVT proph - lovenox


 (Darrell Sosa D.O.)

## 2017-06-27 NOTE — CARDIOLOGY CONSULTATION
Cardiology Consultation


Date of Service


Jun 27, 2017.





Cardiology Consultation


Dictation system down.


Pt seen, examined, and chart reviewed.


Still has severe aortic stenosis, and he still refuses valve replacement 

surgery.


Wants to go home to his wife who is on Hospice.


Will follow.

## 2017-06-27 NOTE — ECHOCARDIOGRAM REPORT
*NOTICE TO RECEIVING PARTY AGENCY**  This information is strictly Confidential and protected under 
[place] law.  [place] law prohibits you from making any further disclosure of this 
information unless further disclosure is expressly permitted by the written consent of the person to 
whom it pertains or is authorized by law.  A general authorization for the release of medical or 
other information is not sufficient for this purpose.  Hospital accepts no responsibility if the 
information is made available to any other person, INCLUDING THE PATIENT.



Interpretation Summary

  *  Name: DIEGO MENARD  Study Date: 2017 09:39 AM  BP: 149/87 mmHg

  *  MRN: [id number]  Patient Location: C.2T\S\S238\S\2  HR: 62

  *  : 1931 (M/d[birth year])  Gender: Male  Height: 65 in

  *  Age: 85 yrs  Ethnicity: [place]  Weight: 160 lb

  *  Ordering Physician: Jose Diaz

  *  Performed By: Shea Meyer

  *  Accession# [id number]  Account# [id number]

  *  Reason For Study: CHEST PAIN

  *  BSA: 1.8 m2

  *  Normal biventricular systolic function.

  *  Moderate concentric left ventricular hypertrophy.

  *  Left ventricular diastolic dysfunction.

  *  Trace mitral regurgitation.

  *  Mild aortic regurgitation.

  *  The study was technically difficult.

Procedure Details

  *  A complete two-dimensional transthoracic echocardiogram was performed (2D, M-mode, Doppler and 
color flow Doppler).

Left Ventricle

  *  The left ventricle is normal in size.

  *  There is moderate concentric left ventricular hypertrophy.

  *  Ejection Fraction = 60-65%.

  *  Left ventricular systolic function is normal.

  *  A full diastolic examination was done with clinical findings of Class I diastolic dysfunction.

  *  The left ventricular wall motion is normal.

Right Ventricle

  *  The right ventricle is normal in size and function.

  *  The right ventricular systolic function is normal as assessed by tricuspid annular plane 
systolic excursion (TAPSE) (normal >1.5 cm).

Atria

  *  The left atrial size is normal.

Mitral Valve

  *  The mitral valve is not well visualized.

  *  There is mild mitral annular calcification.

  *  There is no mitral valve stenosis.

  *  There is trace mitral regurgitation.

Tricuspid Valve

  *  The tricuspid valve is not well visualized.

  *  Significant tricuspid regurgitation is absent.

Aortic Valve

  *  The aortic valve is trileaflet. It is calcified and has decreased opening.

  *  Moderate valvular aortic stenosis.

  *  Mild aortic regurgitation.

Pulmonic Valve

  *  The pulmonic valve is not well visualized.

  *  The pulmonary valve is inadequately visualized, but the Doppler data is adequate for 
interpretation.

  *  There is no pulmonic valvular stenosis.

  *  There is no significant pulmonary regurgitation.

Great Vessels

  *  The aortic root is normal size.

Pericardium/Pleural

  *  There is no pericardial effusion.

Great Vessels

  *  Normal inferior vena cava diameter and respiratory variation suggests normal central venous 
pressure.



MMode 2D Measurements and Calculations

IVSd 1.8 cm

IVSs 2.3 cm



LVIDd 3.7 cm

LVIDs 2.5 cm

LVPWd 1.7 cm

LVPWs 2.4 cm



IVS/LVPW 1.1 

FS 33.9 %

EDV(Teich) 58.5 ml

ESV(Teich) 21.3 ml

EF(Teich) 63.7 %



EDV(cubed) 51.1 ml

ESV(cubed) 14.7 ml

EF(cubed) 71.2 %

% IVS thick 23.9 %

% LVPW thick 39.7 %





LV mass(C)d 272.2 grams

LV mass(C)dI 151.3 grams/m\S\2

LV mass(C)s 283.3 grams

LV mass(C)sI 157.5 grams/m\S\2



CO(Teich) 1.8 l/min

CI(Teich) 1.0 l/min/m\S\2

SV(Teich) 37.3 ml

SI(Teich) 20.7 ml/m\S\2

CO(cubed) 1.8 l/min

CI(cubed) 0.99 l/min/m\S\2

SV(cubed) 36.4 ml

SI(cubed) 20.2 ml/m\S\2



Ao root diam 3.8 cm

Ao root area 11.3 cm\S\2

ACS 0.92 cm

LA dimension 3.8 cm



asc Aorta Diam 5.0 cm





LA/Ao 1.0 

LVOT diam 2.1 cm

LVOT area 3.4 cm\S\2



LVAd ap4 27.7 cm\S\2

LVLd ap4 7.8 cm

EDV(MOD-sp4) 80.0 ml

LVAs ap4 15.7 cm\S\2

LVLs ap4 6.6 cm

ESV(MOD-sp4) 31.0 ml

EF(MOD-sp4) 61.3 %



LVAd ap2 28.5 cm\S\2

LVLd ap2 8.2 cm

EDV(MOD-sp2) 80.9 ml

LVAs ap2 16.4 cm\S\2

LVLs ap2 7.1 cm

ESV(MOD-sp2) 30.9 ml

EF(MOD-sp2) 61.8 %



CO(MOD-sp4) 2.4 l/min

CI(MOD-sp4) 1.3 l/min/m\S\2

SV(MOD-sp4) 49.0 ml

SI(MOD-sp4) 27.2 ml/m\S\2





CO(MOD-sp2) 2.5 l/min

CI(MOD-sp2) 1.4 l/min/m\S\2

SV(MOD-sp2) 50.0 ml

SI(MOD-sp2) 27.8 ml/m\S\2













Doppler Measurements and Calculations

MV E max niall 65.0 cm/sec

MV A max niall 145.0 cm/sec



MV E/A 0.45 



MV dec time 0.24 sec



Ao V2 max 310.2 cm/sec

Ao max PG 38.5 mmHg

Ao max PG (full) 32.1 mmHg

Ao V2 mean 208.9 cm/sec

Ao mean PG 20.1 mmHg

Ao V2 VTI 72.8 cm

DEAN(V,A) 1.4 cm\S\2

DEAN(V,D) 1.4 cm\S\2





AI max niall 300.0 cm/sec

AI max PG 36.0 mmHg

AI dec slope 124.9 cm/sec\S\2

AI P1/2t 703.3 msec



LV V1 max PG 6.4 mmHg



LV V1 max 126.6 cm/sec



SV(Ao) 821.4 ml

SI(Ao) 456.5 ml/m\S\2





PA V2 max 93.1 cm/sec

PA max PG 3.5 mmHg

## 2017-06-27 NOTE — DIAGNOSTIC IMAGING REPORT
PA CHEST WITH ABDOMINAL SERIES



CLINICAL HISTORY:  Generalized abdominal pain. Left-sided chest pain.



FINDINGS:



A PA chest radiograph is compared to study dated 12/22/2016. The examination is

degraded by patient rotation. The heart is enlarged and there is atherosclerotic

calcification with uncoiling of the thoracic aorta. The pulmonary vasculature is

noncongested. Chronic interstitial thickening is unchanged. No airspace

consolidation, large pleural effusion, or pneumothorax is seen. The skeletal

structures are osteopenic. Degenerative change and scoliosis are noted in the

thoracic spine.



Supine and erect abdominal radiographs are correlated with abdominal CT dated

9/1/2013. There is a nonobstructed abdominal bowel gas pattern noting moderate

colonic fecal retention. No evidence of intraperitoneal free air is seen.

Cholecystectomy clips are identified. Pelvic phleboliths are observed. There is

moderate lumbar sacral spondylosis and scoliosis. The bony pelvis appears

intact.



IMPRESSION: 



1. Cardiomegaly with no acute cardiopulmonary abnormality.



2. Nonobstructed abdominal bowel gas pattern noting moderate colonic fecal

retention.







Electronically signed by:  Kobi Clark M.D.

6/27/2017 7:54 AM



Dictated Date/Time:  6/27/2017 7:51 AM

## 2017-06-28 VITALS
HEART RATE: 63 BPM | OXYGEN SATURATION: 93 % | DIASTOLIC BLOOD PRESSURE: 82 MMHG | TEMPERATURE: 98.42 F | SYSTOLIC BLOOD PRESSURE: 151 MMHG

## 2017-06-28 VITALS
TEMPERATURE: 97.34 F | HEART RATE: 68 BPM | DIASTOLIC BLOOD PRESSURE: 79 MMHG | OXYGEN SATURATION: 97 % | SYSTOLIC BLOOD PRESSURE: 120 MMHG

## 2017-06-28 VITALS
OXYGEN SATURATION: 97 % | TEMPERATURE: 97.34 F | SYSTOLIC BLOOD PRESSURE: 120 MMHG | DIASTOLIC BLOOD PRESSURE: 79 MMHG | HEART RATE: 68 BPM

## 2017-06-28 VITALS
HEART RATE: 70 BPM | SYSTOLIC BLOOD PRESSURE: 151 MMHG | TEMPERATURE: 98.78 F | DIASTOLIC BLOOD PRESSURE: 84 MMHG | OXYGEN SATURATION: 94 %

## 2017-06-28 VITALS
OXYGEN SATURATION: 94 % | DIASTOLIC BLOOD PRESSURE: 78 MMHG | HEART RATE: 63 BPM | SYSTOLIC BLOOD PRESSURE: 159 MMHG | TEMPERATURE: 98.96 F

## 2017-06-28 LAB
ANION GAP SERPL CALC-SCNC: 9 MMOL/L (ref 3–11)
BASOPHILS # BLD: 0.02 K/UL (ref 0–0.2)
BASOPHILS NFR BLD: 0.3 %
BUN SERPL-MCNC: 10 MG/DL (ref 7–18)
BUN/CREAT SERPL: 12 (ref 10–20)
CALCIUM SERPL-MCNC: 8.5 MG/DL (ref 8.5–10.1)
CHLORIDE SERPL-SCNC: 112 MMOL/L (ref 98–107)
CO2 SERPL-SCNC: 20 MMOL/L (ref 21–32)
COMPLETE: YES
CREAT CL PREDICTED SERPL C-G-VRATE: 55.9 ML/MIN
CREAT SERPL-MCNC: 0.81 MG/DL (ref 0.6–1.4)
EOSINOPHIL NFR BLD AUTO: 221 K/UL (ref 130–400)
GLUCOSE SERPL-MCNC: 72 MG/DL (ref 70–99)
HCT VFR BLD CALC: 35.4 % (ref 42–52)
IG%: 0.1 %
IMM GRANULOCYTES NFR BLD AUTO: 20.5 %
LYMPHOCYTES # BLD: 1.43 K/UL (ref 1.2–3.4)
MCH RBC QN AUTO: 32 PG (ref 25–34)
MCHC RBC AUTO-ENTMCNC: 33.6 G/DL (ref 32–36)
MCV RBC AUTO: 95.2 FL (ref 80–100)
MONOCYTES NFR BLD: 12.8 %
NEUTROPHILS # BLD AUTO: 4.5 %
NEUTROPHILS NFR BLD AUTO: 61.8 %
PMV BLD AUTO: 9.7 FL (ref 7.4–10.4)
POTASSIUM SERPL-SCNC: 4 MMOL/L (ref 3.5–5.1)
RBC # BLD AUTO: 3.72 M/UL (ref 4.7–6.1)
SODIUM SERPL-SCNC: 141 MMOL/L (ref 136–145)
WBC # BLD AUTO: 6.96 K/UL (ref 4.8–10.8)

## 2017-06-28 RX ADMIN — SODIUM CHLORIDE AND POTASSIUM CHLORIDE SCH MLS/HR: 9; 1.49 INJECTION, SOLUTION INTRAVENOUS at 00:34

## 2017-06-28 RX ADMIN — LOSARTAN POTASSIUM SCH MG: 25 TABLET ORAL at 08:24

## 2017-06-28 RX ADMIN — Medication SCH MG: at 08:24

## 2017-06-28 RX ADMIN — Medication SCH GM: at 08:24

## 2017-06-28 RX ADMIN — ALUMINUM ZIRCONIUM TRICHLOROHYDREX GLY SCH EA: 0.2 STICK TOPICAL at 00:00

## 2017-06-28 RX ADMIN — ALUMINUM ZIRCONIUM TRICHLOROHYDREX GLY SCH EA: 0.2 STICK TOPICAL at 08:00

## 2017-06-28 RX ADMIN — DORZOLAMIDE HYDROCHLORIDE SCH DROPS: 20 SOLUTION/ DROPS OPHTHALMIC at 08:29

## 2017-06-28 NOTE — CARDIOLOGY FOLLOW-UP
Cardiology Follow-Up


Date of Service


Jun 28, 2017.





Cardiology Follow-Up


Subjective


The patient is resting comfortably in bed, anxious for hospital discharge.  

Denies chest pain, dyspnea, and abdominal pain.





Objective


Blood pressure is 151/82 with a regular pulse of 63. Respiratory rate is 16 and 

the patient is afebrile 36.9 degrees Celsius.  Saturations 93 percent on room 

air.  


Neck is supple with delayed and prolonged carotid upstrokes.  A transmitted 

murmurs noted bilaterally. Jugular venous pressure is flat at 90 degrees.


Cardiovascular exam reveals a regular rhythm with a 3/6 crescendo decrescendo 

systolic murmur heard loudest at the base.  No diastolic murmurs.


Lungs are clear without rales, rhonchi, or wheezes.


Abdomen is soft and nontender without bruits.


Extremities reveal intact radial artery pulses bilaterally.  There is no 

peripheral edema.





Data


CBC note hemoglobin of 11.9, hematocrit 35.4, white count 6.9, platelet count 

[id number].


Electrolytes notice of 141, potassium 4.0, chloride 112, bicarb 20, BUN 10, 

creatinine 0.8, glucose 72.


Telemetry months her is benign





Impression and plan


1. Severe aortic stenosis - confirmed on echocardiogram during this 

hospitalization.  He has refused surgery at 2 separate institution is.


2. Nonobstructive coronary artery disease - 50% mid 1st diagonal branch 

stenosis.  He is status post non ST-elevation MI in December 2016.


3. Hypertension - controlled


4. Moderate LVH


5. Mild aortic insufficiency


6. Acute pancreatitis

## 2017-06-28 NOTE — CARDIOLOGY CONSULTATION
Cardiology Consultation


Date of Service


Jun 28, 2017.





Cardiology Consultation


Pertinent history


Mr. Bardales is an 85-year-old male well known to me from the outpatient 

setting.  He was admitted on June 26th with acute pancreatitis and a chest pain 

syndrome.  This consultation was ordered to assistance cardiac management.





The patient is in his usual state of health until approximately 2 weeks prior 

to presentation.  He began to note mid epigastric discomfort and developed 

constipation.  Two days prior to her presentation, he began to note radiation 

of his abdominal discomfort to the mid chest and left shoulder.  He did not 

experience some concurrent shortness of breath, nausea, vomiting, or 

diaphoresis.





The patient carries a history of severe aortic stenosis which was diagnosed at 

the time of the syncopal episode in the October 2014. The patient underwent a 

cardiac catheterization as a preop evaluation.  This revealed a 50% mid 1st 

diagonal branch stenosis.  He eventually was seen at [facility] 

November 2014 and offered valve replacement surgery.  However, the patient 

refused to proceed at that time.  He was then re-evaluated the [facility] in January 2015, was again offered surgery, and again refused.  

He has not experienced exertional angina pectoris or further episodes of 

syncope or presyncope.  He also denies PND, orthopnea, palpitations, lower 

extremity edema, and claudication.





He was admitted here in December 2016 with a non ST elevation MI.  His troponin 

I level peaked at 0.077.  He refused cardiac catheterization at that time and 

opted for conservative medical management.





The patient has been under a great deal of emotional stress as his wife was 

recently hospitalized and discharged to home on hospice care.  The patient is 

anxious to return home to be with his wife.








Past Medical History


1. Nonobstructive coronary artery disease-see above, October 2014


2. Severe aortic stenosis-October 2014


3. Hypertension 


4. Moderate left ventricle hypertrophy


5. Hypercholesterolemia


6. Mild aortic insufficiency


7. Non ST elevation MI-December 2016


8. Hypothyroidism


9. Gilbert's syndrome


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


11. Cholecystectomy


12. Bilateral inguinal hernia repairs


13. Glaucoma








Medications


1. Losartan 25 milligrams daily


2. Aspirin 81 milligrams per day


3. Lovenox 30 milligrams subcu daily 


4. Lumigan eyedrops


5. Trusopt eyedrops








Social history


 and lives with his wife


No tobacco or alcohol








Family history


Noncontributory








Physical exam


Blood pressure is 140/80 with a regular pulse of 60. Respiratory rate is 16 in 

the patient is afebrile 36.9 degrees Celsius.  Saturations 93 percent on room 

air.


HEENT exam is negative


Neck is supple with delayed and prolonged carotid upstrokes.  A transmitted 

murmur is noted bilaterally.  There is no jugular venous distention.


Cardiovascular exam reveals a regular rhythm with a 3/6 crescendo decrescendo 

systolic murmur heard loudest at the base.  S2 is not audible at the apex.


Lungs are clear without rales, rhonchi, or wheezes.


Abdomen is soft with some mid epigastric tenderness. Bowel sounds are normal. 

No bruits.


Extremities reveal intact radial artery pulses bilaterally.  There is no 

peripheral edema.








Data


CBC note hemoglobin of 13.0, hematocrit 39.3, white count 9.9, an appointment 

of 259,000.


Electrolytes noted sodium 140, potassium 3.7, chloride 108, bicarb 25, BUN 15, 

creatinine 0.96, glucose 98.


Lipase was 2005 for 75 on admission, currently 775.


Three troponins are normal


INRs 1.0


EKG notes sinus bradycardia with a left axis deviation, left ventricle 

hypertrophy, and poor R-wave progression across the anterior precordium.








Impression


The patient was admitted with acute pancreatitis and had radiation of his 

discomfort to his chest and shoulder.  Fortunately, his troponin I levels were 

all normal, and there are no acute EKG changes. Doubt that this represents 

myocardial ischemia.





The patient continues to refuse an operative repair of his severe aortic 

stenosis.








Plan


1. Continue current medications.


2. Review echocardiogram in its entirety  


3. Further recommendations depending on his clinical course.

## 2017-06-28 NOTE — DISCHARGE SUMMARY
Discharge Summary


Date of Service


2017.


 (Rosy Dennis, EDILIA)





Discharge Summary


Admission Date:


2017 at 02:59


Discharge Date:  2017


Discharge Disposition:  Home with services


Principal Diagnosis:  Acute pancreatitis


Problems/Secondary Diagnoses:


Chest pain and new EKG changes


CAD


Severe aortic stenosis


5.5 cm aortic aneurysm


HTN


Acute recurrent pancreatitis


h/o hypercholesterolemia


Glaucoma


Constipation


Immunizations:  


   Have You Had Influenza Vaccine:  No


   History of Tetanus Vaccine?:  No


   History of Pneumococcal:  No


   History of Hepatitis B Vaccine:  No


Procedures:


ECHOCARDIOGRAM: 





Interpretation Summary


* Name: DIEGO MENARD  Study Date: 2017 09:39 AM  BP: 149/87 mmHg


* MRN: [id number]  Patient Location: [facility]\S\38\S\2  HR: 62


* : 1931 (M/d/yyyy)  Gender: Male  Height: 65 in


* Age: 85 yrs  Ethnicity: [place]  Weight: 160 lb


* Ordering Physician: Jose Diaz


* Performed By: Shea Meyer


* Accession# [id number]  Account# [id number]


* Reason For Study: CHEST PAIN


* BSA: 1.8 m2


* Normal biventricular systolic function.


* Moderate concentric left ventricular hypertrophy.


* Left ventricular diastolic dysfunction.


* Trace mitral regurgitation.


* Mild aortic regurgitation.


* The study was technically difficult.


Procedure Details


* A complete two-dimensional transthoracic echocardiogram was performed (2D, M-

mode, Doppler and color flow Doppler).


Left Ventricle


* The left ventricle is normal in size.


* There is moderate concentric left ventricular hypertrophy.


* Ejection Fraction = 60-65%.


* Left ventricular systolic function is normal.


* A full diastolic examination was done with clinical findings of Class I 

diastolic dysfunction.


* The left ventricular wall motion is normal.


Right Ventricle


* The right ventricle is normal in size and function.


* The right ventricular systolic function is normal as assessed by tricuspid 

annular plane systolic excursion (TAPSE) (normal >1.5 cm).


Atria


* The left atrial size is normal.


Mitral Valve


* The mitral valve is not well visualized.


* There is mild mitral annular calcification.


* There is no mitral valve stenosis.


* There is trace mitral regurgitation.


Tricuspid Valve


* The tricuspid valve is not well visualized.


* Significant tricuspid regurgitation is absent.


Aortic Valve


* The aortic valve is trileaflet. It is calcified and has decreased opening.


* Moderate valvular aortic stenosis.


* Mild aortic regurgitation.


Pulmonic Valve


* The pulmonic valve is not well visualized.


* The pulmonary valve is inadequately visualized, but the Doppler data is 

adequate for interpretation.


* There is no pulmonic valvular stenosis.


* There is no significant pulmonary regurgitation.


Great Vessels


* The aortic root is normal size.


Pericardium/Pleural


* There is no pericardial effusion.


Great Vessels


* Normal inferior vena cava diameter and respiratory variation suggests normal 

central venous pressure.





MMode 2D Measurements and Calculations











IVSd 1.8 cm


IVSs 2.3 cm


  LVIDd 3.7 cm


LVIDs 2.5 cm


LVPWd 1.7 cm


LVPWs 2.4 cm


  IVS/LVPW 1.1 


FS 33.9 %


EDV(Teich) 58.5 ml


ESV(Teich) 21.3 ml


EF(Teich) 63.7 %


  EDV(cubed) 51.1 ml


ESV(cubed) 14.7 ml


EF(cubed) 71.2 %


% IVS thick 23.9 %


% LVPW thick 39.7 %


 


 


LV mass(C)d 272.2 grams


LV mass(C)dI 151.3 grams/m\S\2


LV mass(C)s 283.3 grams


LV mass(C)sI 157.5 grams/m\S\2


  CO(Teich) 1.8 l/min


CI(Teich) 1.0 l/min/m\S\2


SV(Teich) 37.3 ml


SI(Teich) 20.7 ml/m\S\2


CO(cubed) 1.8 l/min


CI(cubed) 0.99 l/min/m\S\2


SV(cubed) 36.4 ml


SI(cubed) 20.2 ml/m\S\2


  Ao root diam 3.8 cm


Ao root area 11.3 cm\S\2


ACS 0.92 cm


LA dimension 3.8 cm


  asc Aorta Diam 5.0 cm


 


 


LA/Ao 1.0 


LVOT diam 2.1 cm


LVOT area 3.4 cm\S\2


  LVAd ap4 27.7 cm\S\2


LVLd ap4 7.8 cm


EDV(MOD-sp4) 80.0 ml


LVAs ap4 15.7 cm\S\2


LVLs ap4 6.6 cm


ESV(MOD-sp4) 31.0 ml


EF(MOD-sp4) 61.3 %


  LVAd ap2 28.5 cm\S\2


LVLd ap2 8.2 cm


EDV(MOD-sp2) 80.9 ml


LVAs ap2 16.4 cm\S\2


LVLs ap2 7.1 cm


ESV(MOD-sp2) 30.9 ml


EF(MOD-sp2) 61.8 %


  CO(MOD-sp4) 2.4 l/min


CI(MOD-sp4) 1.3 l/min/m\S\2


SV(MOD-sp4) 49.0 ml


SI(MOD-sp4) 27.2 ml/m\S\2


 


 


CO(MOD-sp2) 2.5 l/min


CI(MOD-sp2) 1.4 l/min/m\S\2


SV(MOD-sp2) 50.0 ml


SI(MOD-sp2) 27.8 ml/m\S\2


    








Doppler Measurements and Calculations











MV E max niall 65.0 cm/sec


MV A max niall 145.0 cm/sec


  MV E/A 0.45 


  MV dec time 0.24 sec


  Ao V2 max 310.2 cm/sec


Ao max PG 38.5 mmHg


Ao max PG (full) 32.1 mmHg


Ao V2 mean 208.9 cm/sec


Ao mean PG 20.1 mmHg


Ao V2 VTI 72.8 cm


DEAN(V,A) 1.4 cm\S\2


DEAN(V,D) 1.4 cm\S\2


 


 


AI max niall 300.0 cm/sec


AI max PG 36.0 mmHg


AI dec slope 124.9 cm/sec\S\2


AI P1/2t 703.3 msec


  LV V1 max PG 6.4 mmHg


  LV V1 max 126.6 cm/sec


  SV(Ao) 821.4 ml


SI(Ao) 456.5 ml/m\S\2


 


 


PA V2 max 93.1 cm/sec


PA max PG 3.5 mmHg


    














Created:   


 


Initialized:  17; 1316 <Electronically signed by Anurag Guerrero M.D.>


 


Signed:  17 2001 ______________________________________


 


  Anurag Guerrero M.D.














 The status of this report is Signed.


 


Draft = Not yet reviewed or approved by Cardiologist.


 


Signed = Reviewed and approved by Cardiologist.


 








PA CHEST WITH ABDOMINAL SERIES





CLINICAL HISTORY:  Generalized abdominal pain. Left-sided chest pain.





FINDINGS:





A PA chest radiograph is compared to study dated 2016. The examination is


degraded by patient rotation. The heart is enlarged and there is atherosclerotic


calcification with uncoiling of the thoracic aorta. The pulmonary vasculature is


noncongested. Chronic interstitial thickening is unchanged. No airspace


consolidation, large pleural effusion, or pneumothorax is seen. The skeletal


structures are osteopenic. Degenerative change and scoliosis are noted in the


thoracic spine.





Supine and erect abdominal radiographs are correlated with abdominal CT dated


2013. There is a nonobstructed abdominal bowel gas pattern noting moderate


colonic fecal retention. No evidence of intraperitoneal free air is seen.


Cholecystectomy clips are identified. Pelvic phleboliths are observed. There is


moderate lumbar sacral spondylosis and scoliosis. The bony pelvis appears


intact.





IMPRESSION: 





1. Cardiomegaly with no acute cardiopulmonary abnormality.





2. Nonobstructed abdominal bowel gas pattern noting moderate colonic fecal


retention.











Electronically signed by:  Kobi Clark M.D.


2017 7:54 AM





Dictated Date/Time:  2017 7:51 AM





 The status of this report is Signed.   


 Draft = Not yet reviewed or approved by Radiologist.  


Signed = Reviewed and approved by Radiologist.





CT OF THE ABDOMEN AND PELVIS WITH CONTRAST





CLINICAL HISTORY: Pancreatitis.    





COMPARISON STUDY:  CT of the abdomen and pelvis 2013 and MRCP


September 3, 2013





TECHNIQUE: Following IV administration of 117 mL of Optiray-320, axial images of


the abdomen and pelvis were obtained from the lung bases to the proximal femurs.


Images were reviewed in the axial, sagittal, and coronal planes. IV contrast was


administered without complication.





CT DOSE: 350.96 mGy.cm





FINDINGS: Visual portions of the lower chest demonstrate aneurysmal dilatation


of visualized portions of the ascending aorta which measures approximately 5.5


cm at the level of the main pulmonary artery. This is partially imaged on this


exam but likely mildly increased since exam of 2007 when it measured


5.2 cm. No dissection is shown within visualized portions of the aorta. The


abdominal aorta is ectatic with moderate plaque. The liver, spleen and adrenal


glands are unremarkable. There is no biliary ductal dilatation status post


cholecystectomy. There is mild peripancreatic infiltration and fluid. There is


no peripancreatic fluid collection. There is no pancreatic ductal dilatation.


Several water attenuation bilateral renal lesions reflect cysts. A few


subcentimeter renal lesions are too small to characterize. There is no evidence


for a bowel obstruction. The appendix is normal. This extensive colonic


diverticulosis without evidence for acute diverticulitis. There are


fat-containing bilateral inguinal hernias and fat-containing ventral hernia. The


prostate is moderately enlarged.











IMPRESSION:  





1. Mild peripancreatic infiltration and fluid suggestive of acute pancreatitis.


No biliary ductal dilatation status post cholecystectomy.





2. Aneurysmal dilatation of the ascending aorta, partially imaged on this exam.


Aorta measures approximately 5.5 cm at the level of the main pulmonary artery


which is mildly increased since CT of 2007.





3. Moderate cardiomegaly.





4. Left colon diverticulosis without evidence for acute diverticulitis.











Electronically signed by:  Gabino Brown M.D.


2017 7:31 AM





Dictated Date/Time:  2017 7:20 AM





 The status of this report is Signed.   


 Draft = Not yet reviewed or approved by Radiologist.  


Signed = Reviewed and approved by Radiologist.





CT ANGIOGRAPHY OF THE CHEST WITH AND WITHOUT CONTRAST





CLINICAL HISTORY: Thoracic aortic aneurysm. Chest pain.    





TECHNIQUE: Unenhanced and arterial phase imaging of the chest was performed.


Injection of 94 cc of Optiray 320 IV was uneventful. Sagittal and coronal


reconstructed reviewed as well as maximal intensity projections on an


independent 3-D workstation.





COMPARISON STUDY:  Chest CT 2007.





FINDINGS: There is no intramural hematoma within the thoracic aorta. No


dissection of the thoracic aorta is noted. The ascending aorta measures 5.3 cm


at the level of the main pulmonary artery. This is minimally increased since CT


of 2007 when it measured 5.2 cm. There is moderate plaque. There is


extensive aortic valvular calcification. Aorta measures 4.2 cm at the level of


the sinuses of Valsalva. The heart is moderately enlarged. There is no


pericardial effusion. There are several calcified subcarinal lymph nodes.


Central airways are patent. There is no consolidation to suggest pneumonia. No


pneumothorax or pleural effusion is present. Bony thorax is unremarkable.


Visualized portions the upper abdomen demonstrate a few right renal cysts and


peripancreatic infiltration suggestive of acute pancreatitis which was shown on


recent abdominal CT.





IMPRESSION:  





1. Ascending aortic aneurysm, measuring 5.3 cm. Minimal increase in caliber


since exam of 2007. No dissection.





2. Moderate cardiomegaly.





3. No acute intrathoracic findings.





4. Peripancreatic infiltration indicative of acute pancreatitis, as shown on


recent abdominal CT.











Electronically signed by:  Gabino Brown M.D.


2017 1:53 PM





Dictated Date/Time:  2017 1:40 PM





 The status of this report is Signed.   


 Draft = Not yet reviewed or approved by Radiologist.  


Signed = Reviewed and approved by Radiologist.


Consultations:


Cardiology 


 (Rosy Dennis, EDILIA)





Medication Reconciliation


Continued Medications:  


Aspirin (Aspirin Ec) 81 Mg Tab


81 MG PO DAILY for 30 Days





Bimatoprost (Lumigan) 0.01 % Sol


1 DROPS OPL HS for 90 Days, #7.5 ML 3 Refills





Brimonidine Tartrate-Timolol M (Combigan) 1 Sol Sol


1 DROP OPB BID





Dorzolamide Hcl (Trusopt Oph) 2 % Sol


1 DROPS OP BID, #10 ML 3 Refills





Fluocinonide (Fluocinonide) 0.05 % Sol


1 APPLN TOP BID for 30 Days, #60 ML 2 Refills





Losartan Potassium (Cozaar) 25 Mg Tab


1 TAB PO DAILY for 30 Days, #30 TAB 5 Refills





Nitroglycerin (Nitrostat) 0.4 Mg/1 Tab Subl


0.4 MG SL UD PRN for Chest Pain for 30 Days





Polyethylene Glycol 3350 (Miralax) 1 Pow Pow


17 GM PO QPM





Polyethylene Glycol-Propylene (Systane) 1 Sol Sol


1 DROPS OP DAILY





Prednisolone Acetate (Ophth) (Prednisolone Acetate) 1 % [place]


1 DROP OPL QID for 10 Days


EACH EYE


Prednisolone Acetate (Ophth) (Prednisolone Acetate) 1 % [place]


2 DROP OPR DAILY


EACH EYE








Discharge Exam


Review of Systems:  


   Constitutional:  No fever, No chills, No sweats, No weakness, No fatigue


   ENT:  No hearing loss


   Respiratory:  No cough, No shortness of breath, No hemoptysis


   Cardiovascular:  No chest pain, No edema, No palpitations


   Abdomen:  + constipation, No pain, No nausea, No vomiting, No diarrhea, No 

GI bleeding


   Musculoskeletal:  No joint pain, No muscle pain, No swelling, No calf pain


   Genitourinary - Male:  No hematuria, No dysuria


   Neurologic:  No weakness, No numbness/tingling


   Psychiatric:  No depression symptoms, No anxiety


   Hematologic / Lymphatic:  No abnormal bleeding/bruising


   Integumentary:  No rash, No itch, No new/changing skin lesions


Physical Exam:  


   General Appearance:  no apparent distress


   Eyes:  normal inspection, PERRL


   ENT:  hearing grossly normal


   Neck:  supple


   Respiratory/Chest:  lungs clear, no respiratory distress, no accessory 

muscle use


   Cardiovascular:  regular rate, rhythm, + systolic murmur


   Abdomen / GI:  normal bowel sounds, non tender, soft


   Extremities:  no calf tenderness, no pedal edema


   Neurologic/Psychiatric:  alert, normal mood/affect, oriented x 3


   Skin:  normal color, warm/dry, no rash


 (Rosy Dennis, PAEDMOND)





Hospital Course


H&P on admission: The patient is a 85-year-old male who presents to emergency 

department with symptoms of abdominal pain that began about 2 weeks ago, and 

more recently over the past 2 days has developed chest pain radiating to his 

left shoulder and toward his back.  He does have a known history of heart 

disease.  He does report that aspirin has helped to relieve his chest discomfort

, but nitroglycerin sublinguals have not.  He presently is under stress due to 

the illness of his wife who was moved to a care home 2 weeks ago.  He does have 

chronic constipation for which MiraLAX helps, but he has not had a bowel 

movement in 4 days.  He has chronic difficulty urinating.





Physical Exam


Vital Signs











  Date Time  Temp Pulse Resp B/P (MAP) Pulse Ox O2 Delivery O2 Flow Rate FiO2


 


17 02:52  72 16 139/81 98 Room Air  


 


17 23:16  81 18 145/87 95 Room Air  


 


17 20:57 36.7 74 20 153/92 95 Room Air  








The patient is awake, well-developed and adequately nourished, alert and 

oriented 3, normocephalic and atraumatic, lying in bed and in no acute 

distress.


HEENT--PERRL, EOMI, mucous membranes  and oropharynx dry.


Neck--supple, no JVD or bruits, thyroid normal, trachea midline, no adenopathy.


Heart--normal S1 and S2, no extra beats, no murmurs, rubs or gallops.


Lungs--clear bilaterally with good air movement, no respiratory distress, no 

accessory muscle use.


Abdomen--normal bowel sounds and soft, nontender and nondistended, no hernias 

or masses,  no organomegaly.


Extremities--no cyanosis, clubbing or edema. There are good distal pulses b/l.


Dermatologic--normal skin turgor, normal color, warm and dry, no abnormal lymph 

nodes, no rash.


Neurologic--cranial nerves II through XII grossly intact, motor and sensory 

examination normal.


Rheumatologic--normal range of motion, nontender, muscles and joints.


Psychiatric--normal affect.





Chest pain and new EKG changes/CAD:


- Admit to tele for cardiac monitoring


- Trend cardiac enzymes- negative 


- EKG QAM and PRN CP 


- ECHO 


- Continue ASA 81 mg daily 


- Consulted cardiology, appreciate recommendations- follows w/ Dr. Aguilar 





Severe aortic stenosis- noted: Follows w/ Dr. Aguilar





5.5 cm aortic aneurysm- mildly increased from study in : 


- Known diagnosis to patient


- CTA of chest 


- f/u outpt w/ vascular surgery  





HTN- CONTROLLED: Continue Losartan 25 mg daily  





Acute pancreatitis, lipase is elevated at 2575- etiology uncertain (does have a 

h/o acute pancreatitis in  ?passed CBD stone vs ETOH):


- Follow lipase/amylase- trending downward 


- IV NS + 20 mEq KCL @ 100 ml/hr 


- IV Morphine 1 mg q3 hrs PRN and Tylenol 650 mg q4 hrs PRN for pain control 


   -- Did NOT need pain control in > 24 hours prior to discharge 


- Full liquid diet- advanced as tolerated 


- Denies recent ETOH use, calcium WNL, no trauma, low probability of infectious 

cause  


- Checked triglycerides- WNL    


- Elevated total bilirubin, ?CBD stone- follow 


- Clinically improved w/ supportive care as above, no need for inpatient GI 

consult- can f/u/ outpt 





h/o hypercholesterolemia: 


- no medications noted 


- Reviewed lipid panel 





Glaucoma: Continue usual medications of Lumigan, Combigan, Trusopt, 

Prednisolone acetate, and Systane





Constipation- RESOLVED: 


- CT of abdomen- no obstruction 


- Continue MiraLAX daily


   -- Per patient, has not taking over the last couple of days, which usually 

causes him to become constipated- will continue MiraLAX and add additional 

medications PRN 





DVT Prophylaxis: Lovenox, TEDs/SCDs 





Code Status: LEVEL I, FULL





Dispo: Discharge to home w [facility]


- PT/OT worked w/ patient- OK to return home 


- Case manger setup PCP, GI, and Vascular to call patient w/ f/u 


- Spoke w/ daughter, all questions/concerns addressed and in understanding with 

discharge


Total Time Spent:  Greater than 30 minutes


This includes examination of the patient, discharge planning, medication 

reconciliation, and communication with other providers.


 (Rosy Dennis, EDILIA)


I personally examined pt and verified all riggins points w JUAN Dennis PA-C


feeling better eating better no BM but no pain/distention/nausea and notes that 

frequently he'll have to use miralax and/or dulcolax at home for constipation.  

wants to be able to be with his wife.  appearing much clinically improved.  RN 

noted dtr wondered about keeping pt in hospital longer but sounded like was due 

to her taking care of mother (pt's wife) --> discharge had been written and 

before i was able to contact dtr, pt was discharged with family, however, 

medically appeared safe, pancreatitis resolved, constipation minor, and no 

other new issues requiring hospitalization had arisen; did not appear to need 

SNF or rehab.  ROS otherwise negative except for as above


vitals noted nad breathing unlabored, appearing anxious but no other distress.  

abdomen nondistended.  


acute (recurrent) pancreatitis


-improved, eating well


-no EtOH, no high TG, no evidence of stones -- so will have outpt GI w/u in 

regards to recurrence (does not appear excessively high risk - last occurrence 

being years ago)


-stable for home





TAA-


-outpt vascular eval


-lipids appear to benefit from statin, but with recent pancreatitis, and 

statins rarely but occassinally being culprit for this, will hold for now - 

especially since TAA does not appear to be acute issue.  that said, would 

likely start statin in ~2-4wks if no new issues arise





stable for home


 (Darrell Sosa, D.O.)


Discharge Instructions


Please refer to the electronic Patient Visit Report (Discharge Instructions) 

for additional information.


 (Rosy Dennis PA-C)





Follow-Up


Scheduled follow-up with GI





Scheduled follow-up with PCP 





Vascular surgery to call patient for follow-up 





Please follow-up/keep all of your subspecialty appointments


 (Rosy Dennis PA-C)





Additional Copies To


SILVIA David, MD

## 2017-06-28 NOTE — DISCHARGE INSTRUCTIONS
Discharge Instructions


Date of Service


Jun 28, 2017.





Admission


Reason for Admission:  Chest Pain, Pancreatitis





Discharge


Discharge Diagnosis / Problem:  Acute Pancreatitis





Discharge Goals


Goal(s):  Decrease discomfort, Learn about illness, Diagnostic testing, 

Therapeutic intervention, Prevent Disease Progression





Activity Recommendations


Activity Limitations:  resume your previous activity





.





Instructions / Follow-Up


Instructions / Follow-Up


Resume all regular home medications as prescribed





Follow-Ups:





Please follow-up with your PCP on July 7th





Please follow-up with GI on July 13th to further evaluate your pancreatitis  





Vascular surgery will be calling you with a follow-up appointment to discuss 

your aortic aneurysm





Please follow-up/keep all of your subspecialty appointments





Current Hospital Diet


Patient's current hospital diet: Low Fat Diet





Discharge Diet


Recommended Diet:  Low Fat Diet (Continue to advance as tolerated )





Procedures


Procedures Performed:  


Chest x-ray


Chest CTA


Abdominal CT 


Echocardiogram





Pending Studies


Studies pending at discharge:  no





Laboratory Results





Last 24 Hours








Test


  6/27/17


16:32 6/28/17


06:43


 


Prothrombin Time 11.0 SECONDS  


 


Prothromb Time International


Ratio 1.0 


  


 


 


Activated Partial


Thromboplast Time 24.7 SECONDS 


  


 


 


Partial Thromboplastin Ratio 1.0  


 


Total Creatine Kinase 60 U/L  


 


Creatine Kinase MB 2.0 ng/ml  


 


Creatine Kinase MB Ratio 3.3  


 


Troponin I 0.020 ng/ml  


 


Triglycerides Level 79 mg/dl  


 


Cholesterol Level 174 mg/dl  


 


HDL Cholesterol 39 mg/dl  


 


LDL Cholesterol, Calculated 119 mg/dl  


 


VLDL Cholesterol, Calculated 16 mg/dl  


 


Cholesterol/HDL Ratio 4.5  


 


White Blood Count  6.96 K/uL 


 


Red Blood Count  3.72 M/uL 


 


Hemoglobin  11.9 g/dL 


 


Hematocrit  35.4 % 


 


Mean Corpuscular Volume  95.2 fL 


 


Mean Corpuscular Hemoglobin  32.0 pg 


 


Mean Corpuscular Hemoglobin


Concent 


  33.6 g/dl 


 


 


Platelet Count  221 K/uL 


 


Mean Platelet Volume  9.7 fL 


 


Neutrophils (%) (Auto)  61.8 % 


 


Lymphocytes (%) (Auto)  20.5 % 


 


Monocytes (%) (Auto)  12.8 % 


 


Eosinophils (%) (Auto)  4.5 % 


 


Basophils (%) (Auto)  0.3 % 


 


Neutrophils # (Auto)  4.30 K/uL 


 


Lymphocytes # (Auto)  1.43 K/uL 


 


Monocytes # (Auto)  0.89 K/uL 


 


Eosinophils # (Auto)  0.31 K/uL 


 


Basophils # (Auto)  0.02 K/uL 


 


RDW Standard Deviation  42.4 fL 


 


RDW Coefficient of Variation  12.2 % 


 


Immature Granulocyte % (Auto)  0.1 % 


 


Immature Granulocyte # (Auto)  0.01 K/uL 


 


Sodium Level  141 mmol/L 


 


Potassium Level  4.0 mmol/L 


 


Chloride Level  112 mmol/L 


 


Carbon Dioxide Level  20 mmol/L 


 


Anion Gap  9.0 mmol/L 


 


Blood Urea Nitrogen  10 mg/dl 


 


Creatinine  0.81 mg/dl 


 


Est Creatinine Clear Calc


Drug Dose 


  55.9 ml/min 


 


 


Estimated GFR (


American) 


  93.9 


 


 


Estimated GFR (Non-


American 


  81.0 


 


 


BUN/Creatinine Ratio  12.0 


 


Random Glucose  72 mg/dl 


 


Calcium Level  8.5 mg/dl 


 


Lipase  775 U/L 








Lipid Panel








Test


  6/27/17


16:32 Range/Units


 


 


Triglycerides Level 79  0-150  mg/dl


 


Cholesterol Level 174  0-200  mg/dl


 


HDL Cholesterol 39   mg/dl


 


Cholesterol/HDL Ratio 4.5   


 


LDL Cholesterol, Calculated 119   mg/dl











Medical Emergencies








.


Who to Call and When:





Medical Emergencies:  If at any time you feel your situation is an emergency, 

please call 911 immediately.





.





Non-Emergent Contact


Non-Emergency issues call your:  Primary Care Provider





.


.








"Provider Documentation" section prepared by Rosy Dennis.








.





VTE Core Measure


Inpt VTE Proph given/why not?:  Enoxaparin (Lovenox)SQ, SCD's

## 2018-01-02 ENCOUNTER — HOSPITAL ENCOUNTER (OUTPATIENT)
Dept: HOSPITAL 45 - C.LAB1850 | Age: 83
Discharge: HOME | End: 2018-01-02
Attending: INTERNAL MEDICINE
Payer: COMMERCIAL

## 2018-01-02 DIAGNOSIS — I10: ICD-10-CM

## 2018-01-02 DIAGNOSIS — Z87.19: Primary | ICD-10-CM

## 2018-01-02 DIAGNOSIS — E78.00: ICD-10-CM

## 2018-01-02 LAB
ALBUMIN SERPL-MCNC: 4.1 GM/DL (ref 3.4–5)
ALP SERPL-CCNC: 68 U/L (ref 45–117)
ALT SERPL-CCNC: 31 U/L (ref 12–78)
AST SERPL-CCNC: 30 U/L (ref 15–37)
BUN SERPL-MCNC: 24 MG/DL (ref 7–18)
CALCIUM SERPL-MCNC: 9.3 MG/DL (ref 8.5–10.1)
CO2 SERPL-SCNC: 30 MMOL/L (ref 21–32)
CREAT SERPL-MCNC: 1.05 MG/DL (ref 0.6–1.4)
GLUCOSE SERPL-MCNC: 90 MG/DL (ref 70–99)
KETONES UR QL STRIP: 156 MG/DL
LIPASE: 120 U/L (ref 73–393)
PH UR: 223 MG/DL (ref 0–200)
POTASSIUM SERPL-SCNC: 3.9 MMOL/L (ref 3.5–5.1)
PROT SERPL-MCNC: 7.1 GM/DL (ref 6.4–8.2)
SODIUM SERPL-SCNC: 139 MMOL/L (ref 136–145)